# Patient Record
Sex: MALE | Race: OTHER | HISPANIC OR LATINO | URBAN - METROPOLITAN AREA
[De-identification: names, ages, dates, MRNs, and addresses within clinical notes are randomized per-mention and may not be internally consistent; named-entity substitution may affect disease eponyms.]

---

## 2022-05-05 ENCOUNTER — EMERGENCY (EMERGENCY)
Facility: HOSPITAL | Age: 27
LOS: 1 days | Discharge: HOME | End: 2022-05-05
Attending: EMERGENCY MEDICINE
Payer: COMMERCIAL

## 2022-05-05 VITALS
DIASTOLIC BLOOD PRESSURE: 72 MMHG | RESPIRATION RATE: 18 BRPM | WEIGHT: 195.11 LBS | OXYGEN SATURATION: 99 % | HEIGHT: 67 IN | TEMPERATURE: 99 F | SYSTOLIC BLOOD PRESSURE: 153 MMHG | HEART RATE: 78 BPM

## 2022-05-05 PROCEDURE — 99283 EMERGENCY DEPT VISIT LOW MDM: CPT

## 2022-05-05 RX ORDER — IBUPROFEN 200 MG
600 TABLET ORAL ONCE
Refills: 0 | Status: COMPLETED | OUTPATIENT
Start: 2022-05-05 | End: 2022-05-05

## 2022-05-05 RX ADMIN — Medication 600 MILLIGRAM(S): at 12:07

## 2022-05-05 RX ADMIN — Medication 1 TABLET(S): at 12:06

## 2022-05-05 NOTE — ED PROVIDER NOTE - NSFOLLOWUPINSTRUCTIONS_ED_ALL_ED_FT
Otitis media en los adultos    Otitis Media, Adult       La otitis media es celeste afección que se caracteriza porque el oído medio está [place] e hinchado (inflamado) y lleno de líquido. El oído medio es la parte del oído que contiene los huesos de la audición, así grisel el aire que ayuda a enviar los sonidos al cerebro. Generalmente, la afección desaparece sin tratamiento.      ¿Cuáles son las causas?    Esta afección es consecuencia de celeste obstrucción en la trompa de [place]. La trompa de [place] conecta el oído medio con la parte posterior de la nariz. Normalmente, permite que el aire entre en el oído medio. La causa de la obstrucción es el líquido o la hinchazón. Algunos de los problemas que pueden causar celeste obstrucción son los siguientes:  •Un resfrío o infección que afecta la nariz, la boca o la garganta.      •Alergias.      •Un irritante, grisel el humo del tabaco.      •Adenoides que se pacheco agrandado. Las adenoides son tejido blando ubicado en la parte posterior de la garganta, detrás de la nariz y en el paladar.      •Crecimiento o hinchazón en la parte superior de la garganta, tamara detrás de la nariz (nasofaringe).      •Daño en el oído a causa de cambios en la presión. [place] se denomina barotraumatismo.        ¿Cuáles son los signos o síntomas?    Los síntomas de esta afección incluyen:  •Dolor de oído.      •Fiebre.      •Problemas para oír.      •Cansancio.      •Supuración de líquido por el oído.      •Zumbidos en el oído.        ¿Cómo se trata?    Esta afección puede desaparecer sin tratamiento en el transcurso de 3 a 5 días. Sin embargo, si la causa de la afección es celeste infección bacteriana y no desaparece sin tratamiento, o si vuelve a aparecer más de celeste vez, el médico puede hacer lo siguiente:  •Recetarle antibióticos.      •Administrarle analgésicos.        Siga estas instrucciones en amezcua casa:    •Osborn los medicamentos de venta isaiah y los recetados solamente grisel se lo haya indicado el médico.      •Si le recetaron un antibiótico, tómelo grisel se lo haya indicado el médico. No deje de tesfaye los antibióticos aunque comience a sentirse mejor.      •Concurra a todas visitas de seguimiento grisel se lo haya indicado el médico. [place] es importante.        Comuníquese con un médico si:    •Le sangra la nariz.      •Tiene un bulto en el south.      •No se siente mejor al cabo de 5 días.      •Empeora en lugar de mejorar.        Solicite ayuda de inmediato si:    •Siente dolor que no se estela con los medicamentos.      •Tiene hinchazón, enrojecimiento o dolor en el oído.      •Tiene rigidez en el south.      •No puede  celeste parte de amezcua yamileth (parálisis).      •Nota que el hueso que se encuentra detrás de la oreja le duele al tocarlo.      •Siente un dolor de genoveva muy intenso.        Resumen    •Otitis media significa que el oído medio está [place], hinchado y lleno de líquido.      •Generalmente, esta afección desaparece sin tratamiento.       •Si el problema no desaparece, puede ser necesario realizar un tratamiento. Es posible que le den medicamentos para tratar la infección o para tratar el dolor.      •Si le recetaron un antibiótico, tómelo grisel se lo haya indicado el médico. No deje de tesfaye los antibióticos aunque comience a sentirse mejor.      •Concurra a todas visitas de seguimiento griesl se lo haya indicado el médico. [place] es importante.      Esta información no tiene grisel fin reemplazar el consejo del médico. Asegúrese de hacerle al médico cualquier pregunta que tenga.

## 2022-05-05 NOTE — ED PROVIDER NOTE - OBJECTIVE STATEMENT
Pt. present to the ED c/o b/l ear pain(Left greater than right) for the past 3 days. No fever. No discharge from the ear.  Pt. denies any trauma. Pt. also states that he has been using over the ear headphones a lot recently. Pt. denies any throat pain. No other complaints.

## 2022-05-05 NOTE — ED PROVIDER NOTE - PATIENT PORTAL LINK FT
You can access the FollowMyHealth Patient Portal offered by [facility] by registering at the following website: [email or web address]. By joining [email or web address]’s FollowMyHealth portal, you will also be able to view your health information using other applications (apps) compatible with our system.

## 2022-05-05 NOTE — ED PROVIDER NOTE - CARE PROVIDER_API CALL
Willie Verduzco)  Otolaryngology  [address] [place] [address], [place] [address]  Phone: [phone]  Fax: [phone]  Follow Up Time:

## 2022-10-04 ENCOUNTER — EMERGENCY (EMERGENCY)
Facility: HOSPITAL | Age: 27
LOS: 1 days | Discharge: HOME | End: 2022-10-04
Attending: STUDENT IN AN ORGANIZED HEALTH CARE EDUCATION/TRAINING PROGRAM
Payer: COMMERCIAL

## 2022-10-04 VITALS
DIASTOLIC BLOOD PRESSURE: 84 MMHG | OXYGEN SATURATION: 100 % | HEIGHT: 67 IN | TEMPERATURE: 98 F | SYSTOLIC BLOOD PRESSURE: 129 MMHG | RESPIRATION RATE: 18 BRPM | HEART RATE: 60 BPM | WEIGHT: 194.01 LBS

## 2022-10-04 PROCEDURE — 99283 EMERGENCY DEPT VISIT LOW MDM: CPT

## 2022-10-04 RX ORDER — IBUPROFEN 200 MG
600 TABLET ORAL ONCE
Refills: 0 | Status: COMPLETED | OUTPATIENT
Start: 2022-10-04 | End: 2022-10-04

## 2022-10-04 RX ORDER — IBUPROFEN 200 MG
1 TABLET ORAL
Qty: 9 | Refills: 0
Start: 2022-10-04 | End: 2022-10-06

## 2022-10-04 RX ORDER — OXYCODONE AND ACETAMINOPHEN 5; 325 MG/1; MG/1
1 TABLET ORAL ONCE
Refills: 0 | Status: DISCONTINUED | OUTPATIENT
Start: 2022-10-04 | End: 2022-10-04

## 2022-10-04 RX ADMIN — Medication 600 MILLIGRAM(S): at 02:00

## 2022-10-04 RX ADMIN — OXYCODONE AND ACETAMINOPHEN 1 TABLET(S): 5; 325 TABLET ORAL at 02:01

## 2022-10-04 NOTE — ED PROVIDER NOTE - PATIENT PORTAL LINK FT
You can access the FollowMyHealth Patient Portal offered by [facility] by registering at the following website: [email or web address]. By joining [employer]’s FollowMyHealth portal, you will also be able to view your health information using other applications (apps) compatible with our system.

## 2022-10-04 NOTE — ED ADULT TRIAGE NOTE - CHIEF COMPLAINT QUOTE
Ambulatory reporting L sided mandibular toothache x1 month, has seen a dentist about the pain however states he was not given anything to relieve it, only antibiotics. Tolerating secretions, eating and drinking normally.

## 2022-10-04 NOTE — ED PROVIDER NOTE - PHYSICAL EXAMINATION
Const: Awake, alert and oriented. In no acute distress. Well appearing.  HEENT: NC/AT. Moist mucous membranes. Throat: Pharynx clear uvula is midline tenderness over the left lower molar on palpation no dental abscess tolerating secretions   Eyes: No scleral icterus. EOMI.  Neck:. Soft and supple. Full ROM without pain.  Cardiac: . +S1/S2. No murmurs. Peripheral pulses 2+ and symmetric. No LE edema.  Resp: Speaking in full sentences. No evidence of respiratory distress. No wheezes, rales or rhonchi.  Abd: Soft, non-tender, non-distended. Normal bowel sounds in all 4 quadrants. No guarding or rebound.  Back: Spine midline and non-tender. No CVAT.  Skin: No rashes, abrasions or lacerations.  Lymph: No cervical lymphadenopathy.  Neuro: Awake, alert & oriented x 3. Moves all extremities symmetrically.

## 2022-10-04 NOTE — ED PROVIDER NOTE - OBJECTIVE STATEMENT
pt is a 25 y/o male presenting to the ed for evaluation pt reports pain in his left lower molar. pt states went to his dentist who referred him to a oral surgeon to have the tooth removed. pt currently on amoxicillin. pt states taking tylenol at home which provides minimal relief. pt stating needs other medication for pain  pt denies injuries or trauma to the area. pt denies headache cp sob ear pain sore throat abd pain back pain nausea vomiting numbness or loss of sensation

## 2023-08-16 NOTE — ED ADULT TRIAGE NOTE - PRO INTERPRETER NEED 2
Chart review   High Risk Lung RADS 3 - 1/1/23    No future appointments listed in EPIC  Pt needs f/u and scan - has not scheduled with pulmonary yet    Call placed to patient, left message.     English